# Patient Record
Sex: MALE | Race: WHITE | NOT HISPANIC OR LATINO | Employment: UNEMPLOYED | URBAN - METROPOLITAN AREA
[De-identification: names, ages, dates, MRNs, and addresses within clinical notes are randomized per-mention and may not be internally consistent; named-entity substitution may affect disease eponyms.]

---

## 2021-08-13 ENCOUNTER — HOSPITAL ENCOUNTER (INPATIENT)
Facility: HOSPITAL | Age: 56
LOS: 2 days | Discharge: HOME | DRG: 053 | End: 2021-08-15
Attending: EMERGENCY MEDICINE | Admitting: INTERNAL MEDICINE
Payer: COMMERCIAL

## 2021-08-13 ENCOUNTER — APPOINTMENT (EMERGENCY)
Dept: CT IMAGING | Facility: HOSPITAL | Age: 56
DRG: 053 | End: 2021-08-13
Payer: COMMERCIAL

## 2021-08-13 DIAGNOSIS — R56.9 SEIZURE (HCC): Primary | ICD-10-CM

## 2021-08-13 DIAGNOSIS — E83.42 HYPOMAGNESEMIA: ICD-10-CM

## 2021-08-13 DIAGNOSIS — E87.6 HYPOKALEMIA: ICD-10-CM

## 2021-08-13 DIAGNOSIS — E86.0 DEHYDRATION: ICD-10-CM

## 2021-08-13 PROBLEM — F10.10 ALCOHOL ABUSE: Status: ACTIVE | Noted: 2021-08-13

## 2021-08-13 LAB
ALBUMIN SERPL BCP-MCNC: 3.7 G/DL (ref 3.4–4.8)
ALP SERPL-CCNC: 75.2 U/L (ref 10–129)
ALT SERPL W P-5'-P-CCNC: 29 U/L (ref 5–63)
AMPHETAMINES SERPL QL SCN: NEGATIVE
ANION GAP SERPL CALCULATED.3IONS-SCNC: 14 MMOL/L (ref 4–13)
APAP SERPL-MCNC: <10 UG/ML (ref 10–20)
AST SERPL W P-5'-P-CCNC: 48 U/L (ref 15–41)
BARBITURATES UR QL: NEGATIVE
BASOPHILS # BLD AUTO: 0.03 THOUSANDS/ΜL (ref 0–0.1)
BASOPHILS NFR BLD AUTO: 1 % (ref 0–1)
BENZODIAZ UR QL: NEGATIVE
BILIRUB SERPL-MCNC: 1.15 MG/DL (ref 0.3–1.2)
BUN SERPL-MCNC: 12 MG/DL (ref 6–20)
CALCIUM SERPL-MCNC: 8.7 MG/DL (ref 8.4–10.2)
CHLORIDE SERPL-SCNC: 102 MMOL/L (ref 96–108)
CO2 SERPL-SCNC: 22 MMOL/L (ref 22–33)
COCAINE UR QL: NEGATIVE
CREAT SERPL-MCNC: 1.28 MG/DL (ref 0.5–1.2)
EOSINOPHIL # BLD AUTO: 0.04 THOUSAND/ΜL (ref 0–0.61)
EOSINOPHIL NFR BLD AUTO: 1 % (ref 0–6)
ERYTHROCYTE [DISTWIDTH] IN BLOOD BY AUTOMATED COUNT: 14.7 % (ref 11.6–15.1)
ETHANOL SERPL-MCNC: <10 MG/DL
GFR SERPL CREATININE-BSD FRML MDRD: 62 ML/MIN/1.73SQ M
GLUCOSE SERPL-MCNC: 88 MG/DL (ref 65–140)
HCT VFR BLD AUTO: 37.3 % (ref 36.5–49.3)
HGB BLD-MCNC: 12.7 G/DL (ref 12–17)
IMM GRANULOCYTES # BLD AUTO: 0.01 THOUSAND/UL (ref 0–0.2)
IMM GRANULOCYTES NFR BLD AUTO: 0 % (ref 0–2)
LYMPHOCYTES # BLD AUTO: 0.94 THOUSANDS/ΜL (ref 0.6–4.47)
LYMPHOCYTES NFR BLD AUTO: 16 % (ref 14–44)
MAGNESIUM SERPL-MCNC: 0.9 MG/DL (ref 1.6–2.6)
MCH RBC QN AUTO: 32.2 PG (ref 26.8–34.3)
MCHC RBC AUTO-ENTMCNC: 34 G/DL (ref 31.4–37.4)
MCV RBC AUTO: 95 FL (ref 82–98)
METHADONE UR QL: NEGATIVE
MONOCYTES # BLD AUTO: 0.68 THOUSAND/ΜL (ref 0.17–1.22)
MONOCYTES NFR BLD AUTO: 11 % (ref 4–12)
NEUTROPHILS # BLD AUTO: 4.3 THOUSANDS/ΜL (ref 1.85–7.62)
NEUTS SEG NFR BLD AUTO: 71 % (ref 43–75)
OPIATES UR QL SCN: NEGATIVE
OXYCODONE+OXYMORPHONE UR QL SCN: NEGATIVE
PCP UR QL: NEGATIVE
PLATELET # BLD AUTO: 201 THOUSANDS/UL (ref 149–390)
PMV BLD AUTO: 8.8 FL (ref 8.9–12.7)
POTASSIUM SERPL-SCNC: 3.4 MMOL/L (ref 3.5–5)
PROT SERPL-MCNC: 6.9 G/DL (ref 6.4–8.3)
RBC # BLD AUTO: 3.94 MILLION/UL (ref 3.88–5.62)
SALICYLATES SERPL-MCNC: <2.5 MG/DL (ref 6–30)
SODIUM SERPL-SCNC: 138 MMOL/L (ref 133–145)
THC UR QL: POSITIVE
TSH SERPL DL<=0.05 MIU/L-ACNC: 3.6 UIU/ML (ref 0.34–5.6)
WBC # BLD AUTO: 6 THOUSAND/UL (ref 4.31–10.16)

## 2021-08-13 PROCEDURE — G1004 CDSM NDSC: HCPCS

## 2021-08-13 PROCEDURE — 99285 EMERGENCY DEPT VISIT HI MDM: CPT

## 2021-08-13 PROCEDURE — 70450 CT HEAD/BRAIN W/O DYE: CPT

## 2021-08-13 PROCEDURE — 36415 COLL VENOUS BLD VENIPUNCTURE: CPT | Performed by: EMERGENCY MEDICINE

## 2021-08-13 PROCEDURE — 80307 DRUG TEST PRSMV CHEM ANLYZR: CPT | Performed by: EMERGENCY MEDICINE

## 2021-08-13 PROCEDURE — 96367 TX/PROPH/DG ADDL SEQ IV INF: CPT

## 2021-08-13 PROCEDURE — 99223 1ST HOSP IP/OBS HIGH 75: CPT | Performed by: PHYSICIAN ASSISTANT

## 2021-08-13 PROCEDURE — 84443 ASSAY THYROID STIM HORMONE: CPT | Performed by: PHYSICIAN ASSISTANT

## 2021-08-13 PROCEDURE — 80179 DRUG ASSAY SALICYLATE: CPT | Performed by: EMERGENCY MEDICINE

## 2021-08-13 PROCEDURE — 83735 ASSAY OF MAGNESIUM: CPT | Performed by: EMERGENCY MEDICINE

## 2021-08-13 PROCEDURE — 80053 COMPREHEN METABOLIC PANEL: CPT | Performed by: EMERGENCY MEDICINE

## 2021-08-13 PROCEDURE — 99285 EMERGENCY DEPT VISIT HI MDM: CPT | Performed by: EMERGENCY MEDICINE

## 2021-08-13 PROCEDURE — 85025 COMPLETE CBC W/AUTO DIFF WBC: CPT | Performed by: EMERGENCY MEDICINE

## 2021-08-13 PROCEDURE — 96361 HYDRATE IV INFUSION ADD-ON: CPT

## 2021-08-13 PROCEDURE — 82077 ASSAY SPEC XCP UR&BREATH IA: CPT | Performed by: EMERGENCY MEDICINE

## 2021-08-13 PROCEDURE — 96365 THER/PROPH/DIAG IV INF INIT: CPT

## 2021-08-13 PROCEDURE — 93005 ELECTROCARDIOGRAM TRACING: CPT

## 2021-08-13 PROCEDURE — 80143 DRUG ASSAY ACETAMINOPHEN: CPT | Performed by: EMERGENCY MEDICINE

## 2021-08-13 PROCEDURE — 96375 TX/PRO/DX INJ NEW DRUG ADDON: CPT

## 2021-08-13 RX ORDER — POTASSIUM CHLORIDE 14.9 MG/ML
20 INJECTION INTRAVENOUS ONCE
Status: COMPLETED | OUTPATIENT
Start: 2021-08-13 | End: 2021-08-13

## 2021-08-13 RX ORDER — LORAZEPAM 1 MG/1
2 TABLET ORAL ONCE
Status: COMPLETED | OUTPATIENT
Start: 2021-08-13 | End: 2021-08-13

## 2021-08-13 RX ORDER — POTASSIUM CHLORIDE 20 MEQ/1
40 TABLET, EXTENDED RELEASE ORAL ONCE
Status: COMPLETED | OUTPATIENT
Start: 2021-08-13 | End: 2021-08-13

## 2021-08-13 RX ORDER — SODIUM CHLORIDE 9 MG/ML
125 INJECTION, SOLUTION INTRAVENOUS CONTINUOUS
Status: DISCONTINUED | OUTPATIENT
Start: 2021-08-13 | End: 2021-08-14

## 2021-08-13 RX ORDER — ACETAMINOPHEN 325 MG/1
650 TABLET ORAL EVERY 6 HOURS PRN
Status: DISCONTINUED | OUTPATIENT
Start: 2021-08-13 | End: 2021-08-15 | Stop reason: HOSPADM

## 2021-08-13 RX ORDER — LORAZEPAM 2 MG/ML
INJECTION INTRAMUSCULAR
Status: COMPLETED
Start: 2021-08-13 | End: 2021-08-13

## 2021-08-13 RX ORDER — LEVETIRACETAM 500 MG/1
750 TABLET ORAL EVERY 12 HOURS SCHEDULED
Status: DISCONTINUED | OUTPATIENT
Start: 2021-08-14 | End: 2021-08-15 | Stop reason: HOSPADM

## 2021-08-13 RX ORDER — LORAZEPAM 2 MG/ML
2 INJECTION INTRAMUSCULAR EVERY 6 HOURS PRN
Status: DISCONTINUED | OUTPATIENT
Start: 2021-08-13 | End: 2021-08-15 | Stop reason: HOSPADM

## 2021-08-13 RX ORDER — NICOTINE 21 MG/24HR
1 PATCH, TRANSDERMAL 24 HOURS TRANSDERMAL DAILY
Status: DISCONTINUED | OUTPATIENT
Start: 2021-08-14 | End: 2021-08-15 | Stop reason: HOSPADM

## 2021-08-13 RX ORDER — LORAZEPAM 2 MG/ML
2 INJECTION INTRAMUSCULAR ONCE
Status: COMPLETED | OUTPATIENT
Start: 2021-08-13 | End: 2021-08-13

## 2021-08-13 RX ORDER — MAGNESIUM SULFATE HEPTAHYDRATE 40 MG/ML
2 INJECTION, SOLUTION INTRAVENOUS ONCE
Status: COMPLETED | OUTPATIENT
Start: 2021-08-13 | End: 2021-08-13

## 2021-08-13 RX ADMIN — LORAZEPAM 2 MG: 2 INJECTION, SOLUTION INTRAMUSCULAR; INTRAVENOUS at 19:29

## 2021-08-13 RX ADMIN — LORAZEPAM 2 MG: 1 TABLET ORAL at 21:35

## 2021-08-13 RX ADMIN — SODIUM CHLORIDE 1000 ML: 0.9 INJECTION, SOLUTION INTRAVENOUS at 17:38

## 2021-08-13 RX ADMIN — SODIUM CHLORIDE 125 ML/HR: 0.9 INJECTION, SOLUTION INTRAVENOUS at 21:35

## 2021-08-13 RX ADMIN — MAGNESIUM OXIDE TAB 400 MG (241.3 MG ELEMENTAL MG) 800 MG: 400 (241.3 MG) TAB at 18:35

## 2021-08-13 RX ADMIN — LEVETIRACETAM 1500 MG: 100 INJECTION, SOLUTION INTRAVENOUS at 18:57

## 2021-08-13 RX ADMIN — POTASSIUM CHLORIDE 40 MEQ: 1500 TABLET, EXTENDED RELEASE ORAL at 18:35

## 2021-08-13 RX ADMIN — LORAZEPAM 2 MG: 2 INJECTION INTRAMUSCULAR; INTRAVENOUS at 18:41

## 2021-08-13 RX ADMIN — MAGNESIUM SULFATE HEPTAHYDRATE 2 G: 40 INJECTION, SOLUTION INTRAVENOUS at 18:35

## 2021-08-13 RX ADMIN — POTASSIUM CHLORIDE 20 MEQ: 14.9 INJECTION, SOLUTION INTRAVENOUS at 19:16

## 2021-08-13 NOTE — ED NOTES
Pt restless attempting to ambulate, unable to follow directions   md seen pt    Ativan 2mg given will con't to monitor      German Mares RN  08/13/21 1931

## 2021-08-13 NOTE — ED NOTES
1837 pt had episode of seizure (possi 3 mins) ativan 2mg given 2l 02 md dwaine seen pt con't on cardiac monitor, awake but speech garbled      Silvio Ibarra, RN  08/13/21 1911

## 2021-08-13 NOTE — ED PROVIDER NOTES
History  Chief Complaint   Patient presents with    Seizure - Prior Hx Of     wittness seizure, c/o right elbow discomfort, LOC, reported last seizure 3years ago   denies hitting head, denies c-s tenderness        History provided by:  Patient and EMS personnel   used: No    Seizure - Prior Hx Of  77-year-old male brought by EMS for evaluation after having a witnessed seizure at home  Was witnessed by nephew, reportedly GTC and lasting approximately 2 minutes with fairly quick return to baseline  Initially EMS reported this may be secondary to alcohol withdrawal   Patient reports drinking a 6 pack of beer daily  States he had already had 4 beers today  Denies missing any normal daily drinking  Reports that he was and an out of house today, and heat, greater than 90°  Seizure occurred in the house  No significant trauma  Patient denies headache, neck pain, chest or abdominal pain  No tongue injury or incontinence  He reports history of seizure once in the past, 3 years ago which he states was related to the heat  Unclear if it was truly a seizure and if he had a workup or if it was more of a syncopal episode  No prior records available  Unremarkable neurologic exam here  Is mildly tachycardic on arrival   Plan CT head, EKG, fluids and will re-evaluate  None       Past Medical History:   Diagnosis Date    Alcohol abuse        History reviewed  No pertinent surgical history  History reviewed  No pertinent family history  I have reviewed and agree with the history as documented      E-Cigarette/Vaping    E-Cigarette Use Never User      E-Cigarette/Vaping Substances    Nicotine No     THC No     CBD No     Flavoring No     Other No     Unknown No      Social History     Tobacco Use    Smoking status: Current Every Day Smoker     Packs/day: 1 00     Types: Cigarettes    Smokeless tobacco: Current User    Tobacco comment: 1 pack per day    Vaping Use    Vaping Use: Never used   Substance Use Topics    Alcohol use: Yes     Alcohol/week: 6 0 standard drinks     Types: 6 Cans of beer per week     Comment: reported dring mostly every day especially on the weekend     Drug use: Never       Review of Systems   Constitutional: Negative for activity change, appetite change, fatigue and fever  Respiratory: Negative for chest tightness and shortness of breath  Cardiovascular: Negative for chest pain and palpitations  Gastrointestinal: Negative for abdominal pain, nausea and vomiting  Musculoskeletal: Negative for back pain and neck pain  Skin: Negative for color change and rash  Neurological: Positive for seizures  Negative for dizziness and headaches  All other systems reviewed and are negative  Physical Exam  Physical Exam  Vitals and nursing note reviewed  Constitutional:       Appearance: Normal appearance  HENT:      Head: Normocephalic and atraumatic  Eyes:      Extraocular Movements: Extraocular movements intact  Pupils: Pupils are equal, round, and reactive to light  Neck:      Comments: No C-spine tenderness  Cardiovascular:      Rate and Rhythm: Normal rate and regular rhythm  Pulses: Normal pulses  Heart sounds: Normal heart sounds  Pulmonary:      Effort: Pulmonary effort is normal       Breath sounds: Normal breath sounds  Abdominal:      General: There is no distension  Palpations: Abdomen is soft  Tenderness: There is no abdominal tenderness  Musculoskeletal:         General: No swelling, tenderness or deformity  Normal range of motion  Cervical back: Normal range of motion and neck supple  Skin:     General: Skin is warm and dry  Neurological:      General: No focal deficit present  Mental Status: He is alert and oriented to person, place, and time  Mental status is at baseline     Psychiatric:         Mood and Affect: Mood normal          Behavior: Behavior normal          Vital Signs  ED Triage Vitals [08/13/21 1728]   Temperature Pulse Respirations Blood Pressure SpO2   98 3 °F (36 8 °C) 98 16 141/91 97 %      Temp Source Heart Rate Source Patient Position - Orthostatic VS BP Location FiO2 (%)   Oral Monitor Lying Right arm --      Pain Score       No Pain           Vitals:    08/13/21 1728 08/13/21 1911   BP: 141/91 (!) 168/101   Pulse: 98    Patient Position - Orthostatic VS: Lying Lying         Visual Acuity      ED Medications  Medications   potassium chloride 20 mEq IVPB (premix) (20 mEq Intravenous New Bag 8/13/21 1916)   sodium chloride 0 9 % bolus 1,000 mL (0 mL Intravenous Stopped 8/13/21 1834)   potassium chloride (K-DUR,KLOR-CON) CR tablet 40 mEq (40 mEq Oral Given 8/13/21 1835)   magnesium sulfate 2 g/50 mL IVPB (premix) 2 g (0 g Intravenous Stopped 8/13/21 1858)   magnesium oxide (MAG-OX) tablet 800 mg (800 mg Oral Given 8/13/21 1835)   LORazepam (ATIVAN) 2 mg/mL injection **ADS Override Pull** (2 mg  Given 8/13/21 1841)   LORazepam (ATIVAN) injection 2 mg (2 mg Intravenous Given 8/13/21 1929)   levETIRAcetam (KEPPRA) 1,500 mg in sodium chloride 0 9 % 100 mL IVPB (0 mg Intravenous Stopped 8/13/21 1914)       Diagnostic Studies  Results Reviewed     Procedure Component Value Units Date/Time    Rapid drug screen, urine [[id number]]  (Abnormal) Collected: 08/13/21 1834    Lab Status: Final result Specimen: Urine, Clean Catch Updated: 08/13/21 1906     Amph/Meth UR Negative     Barbiturate Ur Negative     Benzodiazepine Urine Negative     Cocaine Urine Negative     Methadone Urine Negative     Opiate Urine Negative     PCP Ur Negative     THC Urine Positive     Oxycodone Urine Negative    Narrative:      Presumptive report  If requested, specimen will be sent to reference lab for confirmation  FOR MEDICAL PURPOSES ONLY  IF CONFIRMATION NEEDED PLEASE CONTACT THE LAB WITHIN 5 DAYS      Drug Screen Cutoff Levels:  AMPHETAMINE/METHAMPHETAMINES  1000 ng/mL  BARBITURATES     200 ng/mL  BENZODIAZEPINES     200 ng/mL  COCAINE      300 ng/mL  METHADONE      300 ng/mL  OPIATES      300 ng/mL  PHENCYCLIDINE     25 ng/mL  THC       50 ng/mL  OXYCODONE      100 ng/mL    Magnesium [[id number]]  (Abnormal) Collected: 08/13/21 1735    Lab Status: Final result Specimen: Blood from Arm, Left Updated: 08/13/21 1812     Magnesium 0 9 mg/dL     Comprehensive metabolic panel [[id number]]  (Abnormal) Collected: 08/13/21 1735    Lab Status: Final result Specimen: Blood from Arm, Left Updated: 08/13/21 1801     Sodium 138 mmol/L      Potassium 3 4 mmol/L      Chloride 102 mmol/L      CO2 22 mmol/L      ANION GAP 14 mmol/L      BUN 12 mg/dL      Creatinine 1 28 mg/dL      Glucose 88 mg/dL      Calcium 8 7 mg/dL      AST 48 U/L      ALT 29 U/L      Alkaline Phosphatase 75 2 U/L      Total Protein 6 9 g/dL      Albumin 3 7 g/dL      Total Bilirubin 1 15 mg/dL      eGFR 62 ml/min/1 73sq m     Narrative:      [place] guidelines for Chronic Kidney Disease (CKD):     Stage 1 with normal or high GFR (GFR > 90 mL/min/1 73 square meters)    Stage 2 Mild CKD (GFR = 60-89 mL/min/1 73 square meters)    Stage 3A Moderate CKD (GFR = 45-59 mL/min/1 73 square meters)    Stage 3B Moderate CKD (GFR = 30-44 mL/min/1 73 square meters)    Stage 4 Severe CKD (GFR = 15-29 mL/min/1 73 square meters)    Stage 5 End Stage CKD (GFR <15 mL/min/1 73 square meters)  Note: GFR calculation is accurate only with a steady state creatinine    Ethanol [[id number]]  (Normal) Collected: 08/13/21 1735    Lab Status: Final result Specimen: Blood from Arm, Left Updated: 08/13/21 1801     Ethanol Lvl <87 mg/dL     Salicylate level [[id number]]  (Abnormal) Collected: 08/13/21 1735    Lab Status: Final result Specimen: Blood from Arm, Left Updated: 31/10/55 8386     Salicylate Lvl <0 7 mg/dL     Acetaminophen level-If concentration is detectable, please discuss with medical  on call   [[id number]]  (Abnormal) Collected: 08/13/21 1735    Lab Status: Final result Specimen: Blood from Arm, Left Updated: 08/13/21 1759     Acetaminophen Level <10 ug/mL     CBC and differential [[id number]]  (Abnormal) Collected: 08/13/21 1735    Lab Status: Final result Specimen: Blood from Arm, Left Updated: 08/13/21 1745     WBC 6 00 Thousand/uL      RBC 3 94 Million/uL      Hemoglobin 12 7 g/dL      Hematocrit 37 3 %      MCV 95 fL      MCH 32 2 pg      MCHC 34 0 g/dL      RDW 14 7 %      MPV 8 8 fL      Platelets 622 Thousands/uL      Neutrophils Relative 71 %      Immat GRANS % 0 %      Lymphocytes Relative 16 %      Monocytes Relative 11 %      Eosinophils Relative 1 %      Basophils Relative 1 %      Neutrophils Absolute 4 30 Thousands/µL      Immature Grans Absolute 0 01 Thousand/uL      Lymphocytes Absolute 0 94 Thousands/µL      Monocytes Absolute 0 68 Thousand/µL      Eosinophils Absolute 0 04 Thousand/µL      Basophils Absolute 0 03 Thousands/µL                  CT head without contrast   Final Result by Jarad Ireland MD (08/13 1835)      No acute intracranial abnormality  Scalp soft tissue swelling at the vertex on the right without underlying fracture              Workstation performed: [id number]                    Procedures  ECG 12 Lead Documentation Only    Date/Time: 8/13/2021 6:05 PM  Performed by: Allison Santiago MD  Authorized by: Allison Santiago MD     Indications / Diagnosis:  Syncope/seizure  ECG reviewed by me, the ED Provider: yes    Patient location:  ED  Previous ECG:     Previous ECG:  Unavailable  Rate:     ECG rate:  94  Rhythm:     Rhythm: sinus rhythm    Ectopy:     Ectopy: none    QRS:     QRS axis:  Normal  Conduction:     Conduction: normal    ST segments:     ST segments:  Normal  T waves:     T waves: normal               ED Course  ED Course as of Aug 13 2028   Fri Aug 13, 2021   1807 Potassium(!): 3 4   1807 Creatinine(!): 1 28   1807 AST(!): 48   1816 Magnesium(!!): 0 9   1847 Patient had seizure in the ED while taking oral potassium pills  Generalized tonic clonic lasting about 1-2 minutes and resolving spontaneously  Giving Ativan, Keppra load  Will need admission  1928 Patient remains agitated, post-ictal, not redirectable  Risk of harming self  Giving additional 2mg ativan  1930 Discussed case with on-call epileptologist   Notes no indication for continuous EEG monitoring  Will plan admission with possible routine EEG  MDM  Number of Diagnoses or Management Options  Dehydration: new and requires workup  Hypokalemia: new and requires workup  Hypomagnesemia: new and requires workup  Seizure [facility]): new and requires workup  Diagnosis management comments: 57-year-old male brought for evaluation of seizure at home today witnessed by a nephew  Patient had recurrent seizure in the ED which maintaining a slightly resolved after about 2 minutes  He was acutely agitated in the postictal phase requiring IV Ativan with improvement  He was also loaded with 20mg/kg of Keppra without recurrent seizure at this point  Lab work remarkable for hypomagnesia, hypokalemia which were repleted in the ED  Elevated creatinine but unclear baseline as he has no prior lab work here  Case discussed with epileptologist   Yady King with admission  Does not need continuous EEG monitoring  Admitted to medical service in stable condition         Amount and/or Complexity of Data Reviewed  Clinical lab tests: ordered and reviewed  Tests in the radiology section of CPT®: ordered and reviewed  Obtain history from someone other than the patient: yes  Discuss the patient with other providers: yes  Independent visualization of images, tracings, or specimens: yes    Patient Progress  Patient progress: improved      Disposition  Final diagnoses:   Seizure ([address] )   Hypomagnesemia   Hypokalemia   Dehydration     Time reflects when diagnosis was documented in both MDM as applicable and the Disposition within this note     Time User Action Codes Description Comment    8/13/2021  6:16 PM Waynard Lennert Add [R56 9] Seizure ([address] )     8/13/2021  6:16 PM Waynard Lennert Add [E83 42] Hypomagnesemia     8/13/2021  6:16 PM Gwenette Zechariah J Add [E87 6] Hypokalemia     8/13/2021  6:17 PM Waynard Lennert Add [E86 0] Dehydration       ED Disposition     ED Disposition Condition Date/Time Comment    Admit Stable Fri Aug 13, 2021  7:58 PM Case was discussed with Miguel and the patient's admission status was agreed to be Admission Status: inpatient status to the service of Dr Raleigh White   Follow-up Information    None         Patient's Medications    No medications on file     No discharge procedures on file      PDMP Review     None          ED Provider  Electronically Signed by           Miguel Penn MD  08/13/21 2028

## 2021-08-14 LAB
ANION GAP SERPL CALCULATED.3IONS-SCNC: 9 MMOL/L (ref 4–13)
BUN SERPL-MCNC: 9 MG/DL (ref 6–20)
CALCIUM SERPL-MCNC: 7.8 MG/DL (ref 8.4–10.2)
CHLORIDE SERPL-SCNC: 105 MMOL/L (ref 96–108)
CO2 SERPL-SCNC: 25 MMOL/L (ref 22–33)
CREAT SERPL-MCNC: 0.88 MG/DL (ref 0.5–1.2)
ERYTHROCYTE [DISTWIDTH] IN BLOOD BY AUTOMATED COUNT: 14.9 % (ref 11.6–15.1)
GFR SERPL CREATININE-BSD FRML MDRD: 95 ML/MIN/1.73SQ M
GLUCOSE SERPL-MCNC: 73 MG/DL (ref 65–140)
HCT VFR BLD AUTO: 35.9 % (ref 36.5–49.3)
HGB BLD-MCNC: 12.2 G/DL (ref 12–17)
MAGNESIUM SERPL-MCNC: 1.5 MG/DL (ref 1.6–2.6)
MCH RBC QN AUTO: 32.6 PG (ref 26.8–34.3)
MCHC RBC AUTO-ENTMCNC: 34 G/DL (ref 31.4–37.4)
MCV RBC AUTO: 96 FL (ref 82–98)
PLATELET # BLD AUTO: 179 THOUSANDS/UL (ref 149–390)
PMV BLD AUTO: 9.4 FL (ref 8.9–12.7)
POTASSIUM SERPL-SCNC: 3.9 MMOL/L (ref 3.5–5)
RBC # BLD AUTO: 3.74 MILLION/UL (ref 3.88–5.62)
SODIUM SERPL-SCNC: 139 MMOL/L (ref 133–145)
WBC # BLD AUTO: 5.96 THOUSAND/UL (ref 4.31–10.16)

## 2021-08-14 PROCEDURE — 83735 ASSAY OF MAGNESIUM: CPT | Performed by: PHYSICIAN ASSISTANT

## 2021-08-14 PROCEDURE — 99232 SBSQ HOSP IP/OBS MODERATE 35: CPT | Performed by: INTERNAL MEDICINE

## 2021-08-14 PROCEDURE — 80048 BASIC METABOLIC PNL TOTAL CA: CPT | Performed by: PHYSICIAN ASSISTANT

## 2021-08-14 PROCEDURE — 85027 COMPLETE CBC AUTOMATED: CPT | Performed by: PHYSICIAN ASSISTANT

## 2021-08-14 PROCEDURE — 94762 N-INVAS EAR/PLS OXIMTRY CONT: CPT

## 2021-08-14 PROCEDURE — 99222 1ST HOSP IP/OBS MODERATE 55: CPT | Performed by: PSYCHIATRY & NEUROLOGY

## 2021-08-14 RX ORDER — MAGNESIUM SULFATE HEPTAHYDRATE 40 MG/ML
4 INJECTION, SOLUTION INTRAVENOUS ONCE
Status: COMPLETED | OUTPATIENT
Start: 2021-08-14 | End: 2021-08-14

## 2021-08-14 RX ORDER — SODIUM CHLORIDE 9 MG/ML
75 INJECTION, SOLUTION INTRAVENOUS CONTINUOUS
Status: DISCONTINUED | OUTPATIENT
Start: 2021-08-14 | End: 2021-08-14

## 2021-08-14 RX ORDER — POTASSIUM CHLORIDE 14.9 MG/ML
20 INJECTION INTRAVENOUS ONCE
Status: COMPLETED | OUTPATIENT
Start: 2021-08-14 | End: 2021-08-14

## 2021-08-14 RX ORDER — SODIUM CHLORIDE 9 MG/ML
75 INJECTION, SOLUTION INTRAVENOUS CONTINUOUS
Status: DISPENSED | OUTPATIENT
Start: 2021-08-14 | End: 2021-08-14

## 2021-08-14 RX ADMIN — SODIUM CHLORIDE 125 ML/HR: 0.9 INJECTION, SOLUTION INTRAVENOUS at 07:41

## 2021-08-14 RX ADMIN — Medication 1 PATCH: at 15:23

## 2021-08-14 RX ADMIN — SODIUM CHLORIDE 75 ML/HR: 0.9 INJECTION, SOLUTION INTRAVENOUS at 13:56

## 2021-08-14 RX ADMIN — THIAMINE HYDROCHLORIDE: 100 INJECTION, SOLUTION INTRAMUSCULAR; INTRAVENOUS at 13:55

## 2021-08-14 RX ADMIN — SODIUM CHLORIDE 75 ML/HR: 0.9 INJECTION, SOLUTION INTRAVENOUS at 09:14

## 2021-08-14 RX ADMIN — LEVETIRACETAM 750 MG: 500 TABLET, FILM COATED ORAL at 08:31

## 2021-08-14 RX ADMIN — POTASSIUM CHLORIDE 20 MEQ: 14.9 INJECTION, SOLUTION INTRAVENOUS at 09:13

## 2021-08-14 RX ADMIN — LEVETIRACETAM 750 MG: 500 TABLET, FILM COATED ORAL at 20:44

## 2021-08-14 RX ADMIN — SODIUM CHLORIDE 75 ML/HR: 0.9 INJECTION, SOLUTION INTRAVENOUS at 18:00

## 2021-08-14 RX ADMIN — MAGNESIUM SULFATE HEPTAHYDRATE 4 G: 40 INJECTION, SOLUTION INTRAVENOUS at 11:42

## 2021-08-14 NOTE — H&P
Latrice U  64   H&P- Gabby Mayberry 1965, 60 y o  male MRN: [id number]  Unit/Bed#: ED 05 Encounter: [id number]  Primary Care Provider: No primary care provider on file  Date and time admitted to hospital: 8/13/2021  5:22 PM      REQUIRED DOCUMENTATION:     1  This service was provided via Telemedicine  2  Provider located at [facility] Internal Medicine Office   3  TeleMed provider: Nataly Horta PA-C   4  Identify all parties in room with patient during tele consult:  RN  5  Patient was then informed that this was a Telemedicine visit and that the exam was being conducted confidentially over secure lines  My office door was closed  No one else was in the room  Patient acknowledged consent and understanding of privacy and security of the Telemedicine visit, and gave us permission to have the assistant stay in the room in order to assist with the history and to conduct the exam   I informed the patient that I have reviewed their record in Epic and presented the opportunity for them to ask any questions regarding the visit today  The patient agreed to participate  * Seizure [facility])  Assessment & Plan  · Prior to admission as well as witnessed in the ED  Prior seizure history noted  Does not appear to be on medications  Daily drinker ? ETOH withdrawal  Presently post-ictal  CT head negative   · Admit patient to med/surg under inpatient status   · Consult Neurology   · Status post Keppra 1,500 mg BID   · Continue Keppra 750 mg BID thereafter  · Seizure precautions   · On CIWA  · Telemetry     Alcohol abuse  Assessment & Plan  · Reported that patient drinks 6 pack daily  Drank 4 beers today  ETOH negative on admission   · CIWA protocol   · MV, Thiamine, Folic Acid IV due to post-ictal state     Hypokalemia  Assessment & Plan  · Noted at 3 4   Status post repletion in the ED  · BMP in AM     Hypomagnesemia  Assessment & Plan  · Noted at 0 9, status post repletion in the ED  · Mag in AM       VTE Pharmacologic Prophylaxis: VTE Score: 1 Low Risk (Score 0-2) - Encourage Ambulation  Code Status: Full Code   Discussion with family: None at bedside   Anticipated Length of Stay: Patient will be admitted on an inpatient basis with an anticipated length of stay of greater than 2 midnights secondary to As per above assessment and plan   Total Time for Visit, including Counseling / Coordination of Care: 70 minutes Greater than 50% of this total time spent on direct patient counseling and coordination of care  Chief Complaint: Seizure     History of Present Illness:  Ynes Barber is a 60 y o  male with a PMH of seizures, ETOH abuse who presents with seizures  History currently unable to be obtained from patient due to post-ictal state  As per ED MD H&P he had a witnessed seizure at home by his nephew  Lasted approximately 2 minutes  It is reported that he drinks a 6 pack of beer daily and drank 4 beers today  Patient had a second seizure in ED and is now post-ictal      Review of Systems:  Review of Systems   Unable to perform ROS: Mental status change       Past Medical and Surgical History:   Past Medical History:   Diagnosis Date    Alcohol abuse        History reviewed  No pertinent surgical history  Meds/Allergies:  Prior to Admission medications    Not on File     I have reviewed home medications using recent Epic encounter      Allergies: No Known Allergies    Social History:  Marital Status: Unknown   Occupation: Noncontributory   Patient Pre-hospital Living Situation: Home  Patient Pre-hospital Level of Mobility: walks  Patient Pre-hospital Diet Restrictions: None  Substance Use History:   Social History     Substance and Sexual Activity   Alcohol Use Yes    Alcohol/week: 6 0 standard drinks    Types: 6 Cans of beer per week    Comment: reported dring mostly every day especially on the weekend      Social History     Tobacco Use   Smoking Status Current Every Day Smoker    Packs/day: 1 00    Types: Cigarettes   Smokeless Tobacco Current User   Tobacco Comment    1 pack per day      Social History     Substance and Sexual Activity   Drug Use Never       Family History:  History reviewed  No pertinent family history  Physical Exam:     Vitals:   Blood Pressure: (!) 168/101 (08/13/21 1911)  Pulse: 98 (08/13/21 1728)  Temperature: 98 3 °F (36 8 °C) (08/13/21 1728)  Temp Source: Oral (08/13/21 1728)  Respirations: 22 (08/13/21 1911)  Height: 5' 11" (180 3 cm) (08/13/21 1728)  Weight - Scale: 65 8 kg (145 lb) (08/13/21 1728)  SpO2: 97 % (on 2l o2 ) (08/13/21 1911)    Physical Exam  Constitutional:       General: He is not in acute distress  Appearance: Normal appearance  He is normal weight  He is not ill-appearing or diaphoretic  HENT:      Head: Normocephalic and atraumatic  Mouth/Throat:      Mouth: Mucous membranes are moist    Eyes:      General: No scleral icterus  Pupils: Pupils are equal, round, and reactive to light  Cardiovascular:      Rate and Rhythm: Normal rate and regular rhythm  Pulses: Normal pulses  Heart sounds: Normal heart sounds, S1 normal and S2 normal  No murmur heard  No systolic murmur is present  No diastolic murmur is present  No gallop  No S3 or S4 sounds  Pulmonary:      Effort: Pulmonary effort is normal  No accessory muscle usage or respiratory distress  Breath sounds: Normal breath sounds  No stridor  No decreased breath sounds, wheezing, rhonchi or rales  Chest:      Chest wall: No tenderness  Abdominal:      General: Bowel sounds are normal  There is no distension  Palpations: Abdomen is soft  Tenderness: There is no abdominal tenderness  There is no guarding  Musculoskeletal:      Right lower leg: No edema  Left lower leg: No edema  Skin:     General: Skin is warm and dry  Coloration: Skin is not jaundiced  Neurological:      General: No focal deficit present        Mental Status: He is confused  Motor: Seizure activity (post-ictal) present  Psychiatric:         Behavior: Behavior is cooperative  Additional Data:     Lab Results:  Results from last 7 days   Lab Units 08/13/21  1735   WBC Thousand/uL 6 00   HEMOGLOBIN g/dL 12 7   HEMATOCRIT % 37 3   PLATELETS Thousands/uL 201   NEUTROS PCT % 71   LYMPHS PCT % 16   MONOS PCT % 11   EOS PCT % 1     Results from last 7 days   Lab Units 08/13/21  1735   SODIUM mmol/L 138   POTASSIUM mmol/L 3 4*   CHLORIDE mmol/L 102   CO2 mmol/L 22   BUN mg/dL 12   CREATININE mg/dL 1 28*   ANION GAP mmol/L 14*   CALCIUM mg/dL 8 7   ALBUMIN g/dL 3 7   TOTAL BILIRUBIN mg/dL 1 15   ALK PHOS U/L 75 2   ALT U/L 29   AST U/L 48*   GLUCOSE RANDOM mg/dL 88                       Imaging: Reviewed radiology reports from this admission including: CT head  CT head without contrast   Final Result by Jeremiah Fernández MD (08/13 1835)      No acute intracranial abnormality  Scalp soft tissue swelling at the vertex on the right without underlying fracture  Workstation performed: [id number]             EKG and Other Studies Reviewed on Admission:   · EKG: NSR  HR 94 BPM    · CT Head: No acute intracranial abnormality  Scalp soft tissue swelling at the vertex on the right without underlying fracture  ** Please Note: This note has been constructed using a voice recognition system   **

## 2021-08-14 NOTE — PROGRESS NOTES
Latrice U  64   Progress Note - Lisa Life 1965, 60 y o  male MRN: [id number]  Unit/Bed#: MS Oleksandr-Chandan Encounter: [id number]  Primary Care Provider: No primary care provider on file  Date and time admitted to hospital: 2021  5:22 PM    * Seizure [facility])  Assessment & Plan  Admitted seizures  Neurology input noted - Provoked versus unprovoked  Continue Keppra  Seizure precautions, Ativan p r n  Hypomagnesemia  Assessment & Plan  · Replete  · Monitor    Hypokalemia  Assessment & Plan  · Replete  · Monitor    Alcohol abuse  Assessment & Plan  · Reported that patient drinks 6 pack daily  · CIWA protocol   · MV, Thiamine, Folic Acid   · Alcohol cessation discussed          VTE Pharmacologic Prophylaxis:   Pharmacologic: Vena dynes  Mechanical VTE Prophylaxis in Place: Yes    Patient Centered Rounds: I have performed bedside rounds with nursing staff today  Discussions with Specialists or Other Care Team Provider:     Education and Discussions with Family / Patient:  Discussed with the patient, reports he is keeping his family updated    Time Spent for Care: 30 minutes  More than 50% of total time spent on counseling and coordination of care as described above      Current Length of Stay: 1 day(s)    Current Patient Status: Inpatient   Certification Statement: The patient will continue to require additional inpatient hospital stay due to As outlined    Discharge Plan:  Awaiting clinical and symptomatic improvement, Neurology input noted possible discharge 24 to 48 hours    Code Status: Level 1 - Full Code      Subjective:     Comfortably lying in bed  Reports feeling okay  History chart labs medications reviewed  Encouraged out of bed into chair    Objective:     Vitals:   Temp (24hrs), Av 8 °F (37 1 °C), Min:98 3 °F (36 8 °C), Max:99 9 °F (37 7 °C)    Temp:  [98 3 °F (36 8 °C)-99 9 °F (37 7 °C)] 98 4 °F (36 9 °C)  HR:  [74-98] 77  Resp:  [16-22] 18  BP: (141-168)/() 145/101  SpO2:  [93 %-99 %] 98 %  Body mass index is 21 19 kg/m²  Input and Output Summary (last 24 hours): Intake/Output Summary (Last 24 hours) at 8/14/2021 1241  Last data filed at 8/14/2021 1142  Gross per 24 hour   Intake 1563 33 ml   Output 1100 ml   Net 463 33 ml       Physical Exam:     Physical Exam    Comfortably in bed  Neck supple  Lungs diminished breath sounds bilaterally  Heart sounds S1 and S2 noted  Abdomen soft  Awake obeys simple commands  Pulses noted  No rash  No pedal edema    Additional Data:     Labs:    Results from last 7 days   Lab Units 08/14/21  0530 08/13/21  1735   WBC Thousand/uL 5 96 6 00   HEMOGLOBIN g/dL 12 2 12 7   HEMATOCRIT % 35 9* 37 3   PLATELETS Thousands/uL 179 201   NEUTROS PCT %  --  71   LYMPHS PCT %  --  16   MONOS PCT %  --  11   EOS PCT %  --  1     Results from last 7 days   Lab Units 08/14/21  0531 08/13/21  1735   SODIUM mmol/L 139 138   POTASSIUM mmol/L 3 9 3 4*   CHLORIDE mmol/L 105 102   CO2 mmol/L 25 22   BUN mg/dL 9 12   CREATININE mg/dL 0 88 1 28*   ANION GAP mmol/L 9 14*   CALCIUM mg/dL 7 8* 8 7   ALBUMIN g/dL  --  3 7   TOTAL BILIRUBIN mg/dL  --  1 15   ALK PHOS U/L  --  75 2   ALT U/L  --  29   AST U/L  --  48*   GLUCOSE RANDOM mg/dL 73 88                           * I Have Reviewed All Lab Data Listed Above  * Additional Pertinent Lab Tests Reviewed:  All Labs Within Last 24 Hours Reviewed    Imaging:    Imaging Reports Reviewed Today Include:  CT head no acute intracranial abnormality  Imaging Personally Reviewed by Myself Includes:      Recent Cultures (last 7 days):           Last 24 Hours Medication List:   Current Facility-Administered Medications   Medication Dose Route Frequency Provider Last Rate    acetaminophen  650 mg Oral Q6H PRN Miguel Masters PA-C      folic acid 1 mg, thiamine 100 mg in 0 9% sodium chloride 100 mL IVPB   Intravenous Daily Miguel Masters PA-C      levETIRAcetam  750 mg Oral Q12H [address] Miguel Masters PA-C      LORazepam  2 mg Intravenous Q6H PRN Melvina Birmingham PA-C      magnesium sulfate  4 g Intravenous Once Octavia Gomez MD      nicotine  1 patch Transdermal Daily Miguel Masters PA-C      sodium chloride  75 mL/hr Intravenous Continuous Octavia Gomez MD 75 mL/hr (08/14/21 0914)        Today, Patient Was Seen By: Octavia Gomez MD    ** Please Note: Dictation voice to text software may have been used in the creation of this document   **

## 2021-08-14 NOTE — ASSESSMENT & PLAN NOTE
Recurrent seizures, unclear whether provoked or unprovoked though suspicion for new epilepsy overall low  He denies any cessation of drinking, has no clear withdrawal symptomatology, and timing would not be consistent with a withdrawal seizure, though that said his etoh level was undetectable  He was also in significant heat, and with dehydration that may have provoked seizure along with his etoh use   At this time he is back to baseline without further seizure     -continue neurochecks; notify with changes  -seizure precautions  -HCT reviewed - unremarkable  -may continue Keppra 750mg BID though low suspicion for epilepsy - will likely not need this long-term  -would ideally obtain routine EEG if able - if unable this can be done as an outpatient  -no clear indication for MRI as inpatient but may be done as an outpatient as well  -monitor for alcohol withdrawal  -Ativan PRN for recurrent seizure > 3-5 mins or recurrent events without return to baseline  -if repeat seizure would recommend inpatient EEG and possible transfer if indicated  -education of alcohol cessation and resources if amenable  -pt should be informed he may not drive and [employer] form should be filled out  -if he remains stable into tomorrow AM, can be discharged from Neurology standpoint with outpatient follow-up  -call with questions

## 2021-08-14 NOTE — TELEMEDICINE
TeleConsultation - Neurology   Niki Pompa 60 y o  male MRN: [id number]  Unit/Bed#: -01 Encounter: [id number]    REQUIRED DOCUMENTATION:     1  This service was provided via Telemedicine  2  Provider located at office  3  TeleMed provider: Rukhsana Streeter DO   4  Identify all parties in room with patient during tele consult:  Mark Anthony Snyder, RN  5  Patient was then informed that this was a Telemedicine visit and that the exam was being conducted confidentially over secure lines  My office door was closed  No one else was in the room  Patient acknowledged consent and understanding of privacy and security of the Telemedicine visit, and gave us permission to have the assistant stay in the room in order to assist with the history and to conduct the exam   I informed the patient that I have reviewed their record in Epic and presented the opportunity for them to ask any questions regarding the visit today  The patient agreed to participate  Assessment/Plan     * Seizure [facility])  Assessment & Plan  Recurrent seizures, unclear whether provoked or unprovoked though suspicion for new epilepsy overall low  He denies any cessation of drinking, has no clear withdrawal symptomatology, and timing would not be consistent with a withdrawal seizure, though that said his etoh level was undetectable  He was also in significant heat, and with dehydration that may have provoked seizure along with his etoh use   At this time he is back to baseline without further seizure     -continue neurochecks; notify with changes  -seizure precautions  -HCT reviewed - unremarkable  -may continue Keppra 750mg BID though low suspicion for epilepsy - will likely not need this long-term  -would ideally obtain routine EEG if able - if unable this can be done as an outpatient  -no clear indication for MRI as inpatient but may be done as an outpatient as well  -monitor for alcohol withdrawal  -Ativan PRN for recurrent seizure > 3-5 mins or recurrent events without return to baseline  -if repeat seizure would recommend inpatient EEG and possible transfer if indicated  -education of alcohol cessation and resources if amenable  -pt should be informed he may not drive and [employer] form should be filled out  -if he remains stable into tomorrow AM, can be discharged from Neurology standpoint with outpatient follow-up  -call with questions    Matthews Canavan will need follow up in in 4 weeks with epilepsy attending  He will require a routine EEG within 2-4 weeks  Reason for Consult / Principal Problem: seizure  Hx and PE limited by: N/A    HPI: Matthews Canavan is a 60 y o  male with Etoh abuse who presents with witnessed seizure at home  Pt is known to have chronic alcohol abuse and often drinks a 6 pack of beer a day  He was witnessed to have a seizure at home, described as generalized tonic-clonic, lasting about 2-3 minutes before it self-resolved though with a post-ictal state  Pt was combative with EMS and received Ativan prior to arrival to the ED  He remained post-ictal there and then went on to have a second seizure, witnessed by ED staff and again described as generalized tonic-clonic, lasting about 2-3 minutes before resolution  Was given Ativan and then 1 5g of Keppra  Started on 750mg BID Keppra then admitted for further management  Reportedly he had drunk 4 beers the day of his seizures but etoh was negative in his bloodwork  Head CT in the ED was unremarkable  UDS revealed positive THC  Reportedly had no further seizures overnight  Pt himself has no complaints this AM  He confirms that he did not stop drinking at any point and denies any illicit drug use  He states he had an event of LOC 2-3 years ago attributed to alcohol  He was outside in the heat and did feel off before he had these two seizures this time  Does not feel tremulous or anxious at this time      Inpatient consult to Neurology  Consult performed by: Nghia Perdomo DO  Consult ordered by: Felicia Ortega PA-C         Review of Systems   Neurological: Positive for seizures and light-headedness  All other systems reviewed and are negative  Historical Information   Past Medical History:   Diagnosis Date    Alcohol abuse      History reviewed  No pertinent surgical history  Social History   Social History     Substance and Sexual Activity   Alcohol Use Yes    Alcohol/week: 6 0 standard drinks    Types: 6 Cans of beer per week    Comment: reported dring mostly every day especially on the weekend      Social History     Substance and Sexual Activity   Drug Use Never     E-Cigarette/Vaping    E-Cigarette Use Never User      E-Cigarette/Vaping Substances    Nicotine No     THC No     CBD No     Flavoring No     Other No     Unknown No      Social History     Tobacco Use   Smoking Status Current Every Day Smoker    Packs/day: 1 00    Types: Cigarettes   Smokeless Tobacco Current User   Tobacco Comment    1 pack per day      Family History: History reviewed  No pertinent family history  Review of previous medical records was completed      Meds/Allergies   all current active meds have been reviewed, current meds:   Current Facility-Administered Medications   Medication Dose Route Frequency    acetaminophen (TYLENOL) tablet 650 mg  650 mg Oral B9M PRN    folic acid 1 mg, thiamine (VITAMIN B1) 100 mg in sodium chloride 0 9 % 100 mL IV piggyback   Intravenous Daily    levETIRAcetam (KEPPRA) tablet 750 mg  750 mg Oral Q12H [facility]    LORazepam (ATIVAN) injection 2 mg  2 mg Intravenous Q6H PRN    magnesium sulfate 4 g/100 mL IVPB (premix) 4 g  4 g Intravenous Once    nicotine (NICODERM CQ) 14 mg/24hr TD 24 hr patch 1 patch  1 patch Transdermal Daily    potassium chloride 20 mEq IVPB (premix)  20 mEq Intravenous Once    sodium chloride 0 9 % infusion  75 mL/hr Intravenous Continuous    and PTA meds:   None       No Known Allergies    Objective   Vitals:Blood pressure 161/92, pulse 76, temperature 98 4 °F (36 9 °C), temperature source Tympanic, resp  rate 17, height 5' 11" (1 803 m), weight 68 9 kg (151 lb 14 4 oz), SpO2 99 %  ,Body mass index is 21 19 kg/m²  Intake/Output Summary (Last 24 hours) at 8/14/2021 1108  Last data filed at 8/14/2021 0601  Gross per 24 hour   Intake 1563 33 ml   Output 400 ml   Net 1163 33 ml       Invasive Devices: Invasive Devices     Peripheral Intravenous Line            Peripheral IV 08/13/21 Left Antecubital <1 day    Peripheral IV 08/13/21 Right Hand <1 day                Physical Exam  Constitutional:       Appearance: He is well-developed  HENT:      Head: Normocephalic and atraumatic  Eyes:      Extraocular Movements: EOM normal    Neurological:      Mental Status: He is alert and oriented to person, place, and time  Deep Tendon Reflexes: Strength normal    Psychiatric:         Speech: Speech normal        Neurologic Exam     Mental Status   Oriented to person, place, and time  Attention: normal    Speech: speech is normal   Level of consciousness: alert  Able to name object  Able to repeat  Normal comprehension  At times slow to respond/process     Cranial Nerves     CN II   Visual fields full to confrontation  CN III, IV, VI   Extraocular motions are normal      CN V   Facial sensation intact  CN VII   Facial expression full, symmetric  CN XII   Tongue deviation: none    Motor Exam     Strength   Strength 5/5 throughout       Sensory Exam   Light touch normal      Gait, Coordination, and Reflexes Slight dysmetria in bilateral UEs       Lab Results:   CBC:   Results from last 7 days   Lab Units 08/14/21  0530 08/13/21  1735   WBC Thousand/uL 5 96 6 00   RBC Million/uL 3 74* 3 94   HEMOGLOBIN g/dL 12 2 12 7   HEMATOCRIT % 35 9* 37 3   MCV fL 96 95   PLATELETS Thousands/uL 179 201   , BMP/CMP:   Results from last 7 days   Lab Units 08/14/21  0531 08/13/21  1735   SODIUM mmol/L 139 138   POTASSIUM mmol/L 3 9 3 4*   CHLORIDE mmol/L 105 102   CO2 mmol/L 25 22   BUN mg/dL 9 12   CREATININE mg/dL 0 88 1 28*   CALCIUM mg/dL 7 8* 8 7   AST U/L  --  48*   ALT U/L  --  29   ALK PHOS U/L  --  75 2   EGFR ml/min/1 73sq m 95 62   , Drug Screen:   Results from last 7 days   Lab Units 08/13/21  1834   BARBITURATE UR  Negative   BENZODIAZEPINE UR  Negative   THC UR  Positive*   COCAINE UR  Negative   METHADONE URINE  Negative   OPIATE UR  Negative   PCP UR  Negative     Imaging Studies: I have personally reviewed pertinent films in PACS  EKG, Pathology, and Other Studies: I have personally reviewed pertinent reports      VTE Prophylaxis: Sequential compression device (Venodyne)     Code Status: Level 1 - Full Code

## 2021-08-14 NOTE — PLAN OF CARE
Problem: MOBILITY - ADULT  Goal: Maintain or return to baseline ADL function  Description: INTERVENTIONS:  -  Assess patient's ability to carry out ADLs; assess patient's baseline for ADL function and identify physical deficits which impact ability to perform ADLs (bathing, care of mouth/teeth, toileting, grooming, dressing, etc )  - Assess/evaluate cause of self-care deficits   - Assess range of motion  - Assess patient's mobility; develop plan if impaired  - Assess patient's need for assistive devices and provide as appropriate  - Encourage maximum independence but intervene and supervise when necessary  - Involve family in performance of ADLs  - Assess for home care needs following discharge   - Consider OT consult to assist with ADL evaluation and planning for discharge  - Provide patient education as appropriate  Outcome: Not Progressing,new admit  Still need 2 heavy assist

## 2021-08-14 NOTE — ASSESSMENT & PLAN NOTE
· Reported that patient drinks 6 pack daily  Drank 4 beers today   ETOH negative on admission   · CIWA protocol   · MV, Thiamine, Folic Acid IV due to post-ictal state

## 2021-08-14 NOTE — ASSESSMENT & PLAN NOTE
· Reported that patient drinks 6 pack daily    · CIWA protocol   · MV, Thiamine, Folic Acid   · Alcohol cessation discussed

## 2021-08-14 NOTE — ASSESSMENT & PLAN NOTE
· Prior to admission as well as witnessed in the ED  Prior seizure history noted  Does not appear to be on medications  Daily drinker ? ETOH withdrawal  Presently post-ictal  CT head negative   · Admit patient to med/surg under inpatient status   · Consult Neurology   · Status post Keppra 1,500 mg BID   · Continue Keppra 750 mg BID thereafter  · Seizure precautions   · On CIWA  · Telemetry

## 2021-08-14 NOTE — ASSESSMENT & PLAN NOTE
Admitted seizures  Neurology input noted - Provoked versus unprovoked  Continue Keppra  Seizure precautions, Atlainey bryant

## 2021-08-14 NOTE — UTILIZATION REVIEW
Initial Clinical Review    Admission: Date/Time/Statement:   Admission Orders (From admission, onward)     Ordered        08/13/21 [address]  Once                   Orders Placed This Encounter   Procedures    INPATIENT ADMISSION     Standing Status:   Standing     Number of Occurrences:   1     Order Specific Question:   Level of Care     Answer:   Med Surg [16]     Order Specific Question:   Estimated length of stay     Answer:   More than 2 Midnights     Order Specific Question:   Certification     Answer:   I certify that inpatient services are medically necessary for this patient for a duration of greater than two midnights  See H&P and MD Progress Notes for additional information about the patient's course of treatment  ED Arrival Information     Expected Arrival Acuity    - 8/13/2021 17:22 Urgent         Means of arrival Escorted by Service Admission type    Ambulance [facility] Urgent         Arrival complaint    Seizure         Chief Complaint   Patient presents with    Seizure - Prior Hx Of     wittness seizure, c/o right elbow discomfort, LOC, reported last seizure 3years ago   denies hitting head, denies c-s tenderness        Initial Presentation: 61 yo male presented to ED from home via EMS as inpatient admission for seizures  Patient drinks a 6 pack of beer daily all already had 4  Seizures were witnessed by nephew, reportedly GTC and lasting approximately 2 minutes with fairly quick return to baseline  In ED (+) seizure post ictal confused, very agitated IV ativan given and loading doses kepppra done   Plan monitor E-ltyes and supportive care         ED Triage Vitals [08/13/21 1728]   Temperature Pulse Respirations Blood Pressure SpO2   98 3 °F (36 8 °C) 98 16 141/91 97 %      Temp Source Heart Rate Source Patient Position - Orthostatic VS BP Location FiO2 (%)   Oral Monitor Lying Right arm --      Pain Score       No Pain          Wt Readings from Last 1 Encounters:   08/13/21 68 9 kg (151 lb 14 4 oz)     Additional Vital Signs:   Date/Time  Temp  Pulse  Resp  BP  MAP (mmHg)  SpO2  O2 Device  Patient Position - Orthostatic VS   08/14/21 0732  98 4 °F (36 9 °C)  76  17  161/92  136  99 %  None (Room air)  Lying   08/14/21 0559  --  74  --  166/96  --  --  --  --   08/14/21 0200  --  85  --  142/68  --  --  --  --   08/14/21 0110  --  84  --  --  --  97 %  None (Room air)  --   08/13/21 2212  99 9 °F (37 7 °C)  86  18  151/57  112  93 %  None (Room air)  Lying   08/13/21 2120  --  --  --  --  --  97 %  None (Room air)  --   08/13/21 2100  --  96  --  153/82  --  --  --  --   08/13/21 2055  99 °F (37 2 °C)  94  18  155/88  --  97 %  None (Room air)  Lying   08/13/21 2030  --  --  --  --  --  --  None (Room air)  --   08/13/21 1911  --  --  22  168/101Abnormal   --  97 %   Nasal cannula  Lying   SpO2: on 2l o2 at 08/13/21 1911 08/13/21 1745  --  --  --  --  --  --  None (Room air)         Pertinent Labs/Diagnostic Test Results:       Results from last 7 days   Lab Units 08/14/21  0530 08/13/21  1735   WBC Thousand/uL 5 96 6 00   HEMOGLOBIN g/dL 12 2 12 7   HEMATOCRIT % 35 9* 37 3   PLATELETS Thousands/uL 179 201   NEUTROS ABS Thousands/µL  --  4 30         Results from last 7 days   Lab Units 08/14/21  0531 08/13/21  1735   SODIUM mmol/L 139 138   POTASSIUM mmol/L 3 9 3 4*   CHLORIDE mmol/L 105 102   CO2 mmol/L 25 22   ANION GAP mmol/L 9 14*   BUN mg/dL 9 12   CREATININE mg/dL 0 88 1 28*   EGFR ml/min/1 73sq m 95 62   CALCIUM mg/dL 7 8* 8 7   MAGNESIUM mg/dL 1 5* 0 9*     Results from last 7 days   Lab Units 08/13/21  1735   AST U/L 48*   ALT U/L 29   ALK PHOS U/L 75 2   TOTAL PROTEIN g/dL 6 9   ALBUMIN g/dL 3 7   TOTAL BILIRUBIN mg/dL 1 15         Results from last 7 days   Lab Units 08/14/21  0531 08/13/21  1735   GLUCOSE RANDOM mg/dL 73 88         Results from last 7 days   Lab Units 08/13/21  1735   TSH 3RD GENERATON uIU/mL 3 595       Results from last 7 days   Lab Units 08/13/21  1834   AMPH/METH  Negative   BARBITURATE UR  Negative   BENZODIAZEPINE UR  Negative   COCAINE UR  Negative   METHADONE URINE  Negative   OPIATE UR  Negative   PCP UR  Negative   THC UR  Positive*     Results from last 7 days   Lab Units 08/13/21  1735   ETHANOL LVL mg/dL <10   ACETAMINOPHEN LVL ug/mL <95*   SALICYLATE LVL mg/dL <2 2*     CT head 08-13-21   No acute intracranial abnormality  Scalp soft tissue swelling at the vertex on the right without underlying fracture       EKG 08-13-21  NSR              ED Treatment:   Medication Administration from 08/13/2021 1722 to 08/13/2021 2054       Date/Time Order Dose Route Action     08/13/2021 1738 sodium chloride 0 9 % bolus 1,000 mL 1,000 mL Intravenous New Bag     08/13/2021 1835 potassium chloride (K-DUR,KLOR-CON) CR tablet 40 mEq 40 mEq Oral Given     08/13/2021 1835 magnesium sulfate 2 g/50 mL IVPB (premix) 2 g 2 g Intravenous New Bag     08/13/2021 1835 magnesium oxide (MAG-OX) tablet 800 mg 800 mg Oral Given     08/13/2021 1841 LORazepam (ATIVAN) 2 mg/mL injection **ADS Override Pull** 2 mg  Given     08/13/2021 1929 LORazepam (ATIVAN) injection 2 mg 2 mg Intravenous Given     08/13/2021 1857 levETIRAcetam (KEPPRA) 1,500 mg in sodium chloride 0 9 % 100 mL IVPB 1,500 mg Intravenous New Bag     08/13/2021 1916 potassium chloride 20 mEq IVPB (premix) 20 mEq Intravenous New Bag        Past Medical History:   Diagnosis Date    Alcohol abuse      Present on Admission:   Alcohol abuse   Hypokalemia   Hypomagnesemia      Admitting Diagnosis: Dehydration [E86 0]  Hypokalemia [E87 6]  Hypomagnesemia [E83 42]  Seizure ([address] ) [R56 9]  Age/Sex: 60 y o  male  Admission Orders:  Scheduled Medications:  folic acid 1 mg, thiamine 100 mg in 0 9% sodium chloride 100 mL IVPB, , Intravenous, Daily  levETIRAcetam, 750 mg, Oral, Q12H [address]  nicotine, 1 patch, Transdermal, Daily      Continuous IV Infusions:  sodium chloride, 125 mL/hr, Intravenous, Continuous      PRN Meds:  acetaminophen, 650 mg, Oral, Q6H PRN  LORazepam, 2 mg, Intravenous, Q6H PRN        IP CONSULT TO NEUROLOGY   Non violent restraints   laureen precaution  Aspiration precaution   Telemetry  SCD  Continuous pulse oximetry  CIWA score 4-5-6-14      Network Utilization Review Department  ATTENTION: Please call with any questions or concerns to [phone] and carefully listen to the prompts so that you are directed to the right person  All voicemails are confidential   Ashley List all requests for admission clinical reviews, approved or denied determinations and any other requests to dedicated fax number below belonging to the campus where the patient is receiving treatment   List of dedicated fax numbers for the Facilities:  [address] DENIALS (Administrative/Medical Necessity) [phone]   [address] (Maternity/NICU/Pediatrics) [phone]   [address] [facility] [place] 1000 [address] [place] [address] [address] [facility] [place] [address] [phone]

## 2021-08-15 VITALS
DIASTOLIC BLOOD PRESSURE: 98 MMHG | HEIGHT: 71 IN | HEART RATE: 71 BPM | OXYGEN SATURATION: 97 % | RESPIRATION RATE: 17 BRPM | WEIGHT: 151.9 LBS | BODY MASS INDEX: 21.27 KG/M2 | TEMPERATURE: 98.3 F | SYSTOLIC BLOOD PRESSURE: 141 MMHG

## 2021-08-15 LAB
ANION GAP SERPL CALCULATED.3IONS-SCNC: 9 MMOL/L (ref 4–13)
BUN SERPL-MCNC: 9 MG/DL (ref 6–20)
CALCIUM SERPL-MCNC: 7.8 MG/DL (ref 8.4–10.2)
CHLORIDE SERPL-SCNC: 102 MMOL/L (ref 96–108)
CO2 SERPL-SCNC: 26 MMOL/L (ref 22–33)
CREAT SERPL-MCNC: 0.77 MG/DL (ref 0.5–1.2)
GFR SERPL CREATININE-BSD FRML MDRD: 101 ML/MIN/1.73SQ M
GLUCOSE SERPL-MCNC: 86 MG/DL (ref 65–140)
MAGNESIUM SERPL-MCNC: 1.2 MG/DL (ref 1.6–2.6)
POTASSIUM SERPL-SCNC: 3.3 MMOL/L (ref 3.5–5)
SODIUM SERPL-SCNC: 137 MMOL/L (ref 133–145)

## 2021-08-15 PROCEDURE — 80048 BASIC METABOLIC PNL TOTAL CA: CPT | Performed by: INTERNAL MEDICINE

## 2021-08-15 PROCEDURE — 83735 ASSAY OF MAGNESIUM: CPT | Performed by: INTERNAL MEDICINE

## 2021-08-15 PROCEDURE — 99239 HOSP IP/OBS DSCHRG MGMT >30: CPT | Performed by: INTERNAL MEDICINE

## 2021-08-15 RX ORDER — LEVETIRACETAM 750 MG/1
750 TABLET ORAL EVERY 12 HOURS SCHEDULED
Qty: 60 TABLET | Refills: 0 | Status: SHIPPED | OUTPATIENT
Start: 2021-08-15 | End: 2021-09-14

## 2021-08-15 RX ORDER — MAGNESIUM SULFATE HEPTAHYDRATE 40 MG/ML
4 INJECTION, SOLUTION INTRAVENOUS ONCE
Status: COMPLETED | OUTPATIENT
Start: 2021-08-15 | End: 2021-08-15

## 2021-08-15 RX ORDER — POTASSIUM CHLORIDE 20 MEQ/1
40 TABLET, EXTENDED RELEASE ORAL ONCE
Status: COMPLETED | OUTPATIENT
Start: 2021-08-15 | End: 2021-08-15

## 2021-08-15 RX ADMIN — LEVETIRACETAM 750 MG: 500 TABLET, FILM COATED ORAL at 08:50

## 2021-08-15 RX ADMIN — LORAZEPAM 2 MG: 2 INJECTION, SOLUTION INTRAMUSCULAR; INTRAVENOUS at 01:22

## 2021-08-15 RX ADMIN — POTASSIUM CHLORIDE 40 MEQ: 1500 TABLET, EXTENDED RELEASE ORAL at 08:50

## 2021-08-15 RX ADMIN — MAGNESIUM SULFATE HEPTAHYDRATE 4 G: 40 INJECTION, SOLUTION INTRAVENOUS at 08:50

## 2021-08-15 RX ADMIN — Medication 1 PATCH: at 08:50

## 2021-08-15 RX ADMIN — THIAMINE HYDROCHLORIDE: 100 INJECTION, SOLUTION INTRAMUSCULAR; INTRAVENOUS at 11:17

## 2021-08-15 RX ADMIN — MAGNESIUM OXIDE TAB 400 MG (241.3 MG ELEMENTAL MG) 400 MG: 400 (241.3 MG) TAB at 08:50

## 2021-08-15 NOTE — DISCHARGE SUMMARY
Latrice U  64   Discharge- Uma Perkins 1965, 60 y o  male MRN: [id number]  Unit/Bed#: -01 Encounter: [id number]  Primary Care Provider: No primary care provider on file  Date and time admitted to hospital: 8/13/2021  5:22 PM    * Seizure [facility])  Assessment & Plan  Admitted with seizures  Neurology input noted - Provoked versus unprovoked  Continue Keppra  Seizure precautions, Ativan p r n  Patient is instructed not to drive till seen in Neurology office - he is agreeable  Outpatient follow-up with Neurology    Hypomagnesemia  Assessment & Plan  · Replete  · Monitor    Hypokalemia  Assessment & Plan  · Replete  · Monitor    Alcohol abuse  Assessment & Plan  · Reported that patient drinks 6 pack daily  · CIWA protocol   · MV, Thiamine, Folic Acid   · Alcohol cessation discussed          Discharge Summary - [facility]    Patient Information: Uma Perkins 60 y o  male MRN: [id number]  Unit/Bed#: -01 Encounter: [id number]    Discharging Physician / Practitioner: Sol Paul MD  PCP: No primary care provider on file  Admission Date: 8/13/2021  Discharge Date: 08/15/21    Disposition:     Home    Reason for Admission:  Seizures      Discharge Diagnoses:     Principal Problem:    Seizure [facility])  Active Problems:    Alcohol abuse    Hypokalemia    Hypomagnesemia  Resolved Problems:    * No resolved hospital problems  *      Consultations During Hospital Stay:  · Neurology    Procedures Performed:     · CT head no acute intracranial abnormality, scalp soft tissue swelling at the vertex on the right without underlying fracture      Hospital Course:     Uma Perkins is a 60 y o  male patient who originally presented to the hospital on 8/13/2021 due to seizure  Patient with history of chronic alcohol abuse, presented with seizure  He was seen in consultation with Neurology, he was placed on Keppra 750 mg b i d    He did not have any seizure-like activity during his brief hospital stay  He has electrolytes were monitored and repleted  He reports feeling better today  Alcohol abstinence discussed in detail with him he verbalized understanding  He reports he does not drive however stressed not to drive till seen by Neurology in the office he is agreeable  He is symptomatically improved hemodynamically stable and is deemed ready for discharge today  Kindly review the chart for details  Condition at Discharge: fair     Discharge Day Visit / Exam:     Subjective:      Comfortably in bed  Reports feeling better  Agreeable to discharge plan  Alcohol abstinence discussed    Vitals: Blood Pressure: 141/98 (08/15/21 0732)  Pulse: 71 (08/15/21 0732)  Temperature: 98 3 °F (36 8 °C) (08/15/21 0732)  Temp Source: Tympanic (08/15/21 0732)  Respirations: 17 (08/15/21 0732)  Height: 5' 11" (180 3 cm) (08/13/21 2055)  Weight - Scale: 68 9 kg (151 lb 14 4 oz) (08/13/21 2055)  SpO2: 97 % (08/15/21 0732)  Exam:   Physical Exam    Comfortably in bed  Neck supple  Lungs diminished breath sounds bilateral  Heart sounds S1-S2 noted  Abdomen soft  Awake obeys simple commands  Pulses noted  No rash    Discharge instructions/Information to patient and family:   See after visit summary for information provided to patient and family  Discharge plan discussed with the patient, reports he is keeping his family updated  Outpatient close follow-up with Neurology, primary care physician    Provisions for Follow-Up Care:  See after visit summary for information related to follow-up care and any pertinent home health orders  Planned Readmission: no     Discharge Statement:  I spent 45 minutes discharging the patient  This time was spent on the day of discharge  I had direct contact with the patient on the day of discharge   Greater than 50% of the total time was spent examining patient, answering all patient questions, arranging and discussing plan of care with patient as well as directly providing post-discharge instructions  Additional time then spent on discharge activities  Discharge Medications:  See after visit summary for reconciled discharge medications provided to patient and family        ** Please Note: This note has been constructed using a voice recognition system **

## 2021-08-15 NOTE — PLAN OF CARE
Problem: NEUROSENSORY - ADULT  Goal: Achieves stable or improved neurological status  Description: INTERVENTIONS  - Monitor and report changes in neurological status  - Monitor vital signs such as temperature, blood pressure, glucose, and any other labs ordered   - Initiate measures to prevent increased intracranial pressure  - Monitor for seizure activity and implement precautions if appropriate      Outcome: Progressing  Goal: Remains free of injury related to seizures activity  Description: INTERVENTIONS  - Maintain airway, patient safety  and administer oxygen as ordered  - Monitor patient for seizure activity, document and report duration and description of seizure to physician/advanced practitioner  - If seizure occurs,  ensure patient safety during seizure  - Reorient patient post seizure  - Seizure pads on all 4 side rails  - Instruct patient/family to notify RN of any seizure activity including if an aura is experienced  - Instruct patient/family to call for assistance with activity based on nursing assessment  - Administer anti-seizure medications if ordered    Outcome: Progressing  Goal: Achieves maximal functionality and self care  Description: INTERVENTIONS  - Monitor swallowing and airway patency with patient fatigue and changes in neurological status  - Encourage and assist patient to increase activity and self care     - Encourage visually impaired, hearing impaired and aphasic patients to use assistive/communication devices  Outcome: Progressing     Problem: DISCHARGE PLANNING  Goal: Discharge to home or other facility with appropriate resources  Description: INTERVENTIONS:  - Identify barriers to discharge w/patient and caregiver  - Arrange for needed discharge resources and transportation as appropriate  - Identify discharge learning needs (meds, wound care, etc )  - Arrange for interpretive services to assist at discharge as needed  - Refer to Case Management Department for coordinating This office note has been dictated.     discharge planning if the patient needs post-hospital services based on physician/advanced practitioner order or complex needs related to functional status, cognitive ability, or social support system  Outcome: Progressing

## 2021-08-15 NOTE — ASSESSMENT & PLAN NOTE
Admitted with seizures  Neurology input noted - Provoked versus unprovoked  Continue Keppra  Seizure precautions, Ativan p r n    Patient is instructed not to drive till seen in Neurology office - he is agreeable  Outpatient follow-up with Neurology

## 2021-08-16 ENCOUNTER — TELEPHONE (OUTPATIENT)
Dept: NEUROLOGY | Facility: CLINIC | Age: 56
End: 2021-08-16

## 2021-08-16 NOTE — TELEPHONE ENCOUNTER
Sched HFU appt 1/3/2022 with Dr Dafne Meredith in [place]  I placed pt on wait list for an earlier appt  Pt was ok with Jan 2022 appt  Transferred pt to  to sched EEG  Mailed NP paperwork to pt's home  [employer]/SZ/Self Pay    NOTE FROM CHART:  Reason for Consult / Principal Problem: seizure  Maame Boateng will need follow up in in 4 weeks with epilepsy attending  He will require a routine EEG within 2-4 weeks

## 2021-08-20 LAB
ATRIAL RATE: 94 BPM
ATRIAL RATE: 94 BPM
P AXIS: 70 DEGREES
P AXIS: 70 DEGREES
PR INTERVAL: 169 MS
PR INTERVAL: 169 MS
QRS AXIS: 50 DEGREES
QRS AXIS: 50 DEGREES
QRSD INTERVAL: 93 MS
QRSD INTERVAL: 93 MS
QT INTERVAL: 366 MS
QT INTERVAL: 366 MS
QTC INTERVAL: 458 MS
QTC INTERVAL: 458 MS
T WAVE AXIS: 68 DEGREES
T WAVE AXIS: 68 DEGREES
VENTRICULAR RATE: 94 BPM
VENTRICULAR RATE: 94 BPM

## 2021-08-20 PROCEDURE — 93010 ELECTROCARDIOGRAM REPORT: CPT | Performed by: INTERNAL MEDICINE

## 2021-10-20 NOTE — UTILIZATION REVIEW
URGENT/EMERGENT  INPATIENT/SPU AUTHORIZATION REQUEST    Date: 10/20/21            # Pages in this Request:     x New Request   Additional Information for PA#:     Office Contact Name:  Josephine Curran Title: Utilization Review, [facility] Nurse     Phone: [phone]  Ext  Availability (Date/Time): Wednesday - Friday 8 am- 4 pm    x Inpatient Review  SPU Review        Current       x Late Pick-up   · How your facility was first notified of the Late Pick-up: PATHS  · When your facility was first notified of the Late Pick-up (date): 10/15         RECIPIENT INFORMATION    Recipient FL#:[id number]   Recipient Name: Jo Ann Garza    YOB: 1965  60 y o  Recipient Alias:     Gender:  X Male  Female Medicaid Eligibility ([address] [place] [address]): INSURANCE INFORMATION    (All other private or governmental health insurance benefits must be utilized prior to billing the [place] Program)    Was this admission the result of an MVA, other accident, assault, injury, fall, gunshot, bite etc ? Yes X No                   If yes, provide a brief description of the incident  Does the recipient have other insurance coverage? Yes X No        Insurance Company Name/Policy #      Did that insurance pay on this claim? Yes  No        Did that insurance deny this claim? Yes  No    If yes, reason for denial:      Does the recipient have Medicare? Yes X No        Did Medicare exhaust prior to this admission? Yes  No            Did Medicare partially pay this claim? Yes  No        Did that insurance deny this claim? Yes  No    If yes, reason for denial:          Was the recipient a prisoner at the time of admission?    Yes X No            PROVIDER INFORMATION    Hospital Name: [facility] [address] Provider ID#: [id number]    [address] [place] [address] [facility] Provider ID#: [id number]        ADMISSION INFORMATION    Type of Admission: (please choose one)    X ED      Direct    If yes, from where? Transfer    If yes, transferring hospital (inpatient, rehab, or psych) Provider Name/Provider ID#: Admission Floor or Unit Type: MED-SURG    Dates/Times:        ED Date/Time: 8/13/2021  17:22         Observation Date/Time:         Admission Date/Time: 8/13/21  19:59 PM        Discharge or Transfer Date/Time: 8/15/2021 1007        DIAGNOSIS/PROCEDURE CODES    Primary Diagnosis Code/Primary Diagnosis Code description:   Dehydration [E86 0]  Hypokalemia [E87 6]  Hypomagnesemia [E83 42]  Seizure ([facility] [address] ) [R56 9]  (MAY RE-ORDER DX CODES)    Additional Diagnosis Code(s) and Description(s)-(up to three additional codes):     Procedure Code (one) and description:NONE        CLINICAL INFORMATION - PRIOR ADMISSION ONLY    Is there a prior admission with a discharge date within 30 days of the date of this admission? X No (Proceed to the next section - "Clinical Information - General Review Checklist:)      Yes (Provide the following information)     Prior admission dates:    MA Prior Authorization Number:        Review Outcome:     Diagnosis Code(s)/Description:    Procedure Code/Description:    Findings:    Treatment:    Condition on Discharge:   Vitals:    Labs:   Imaging:   Medications: Follow-up Instructions:    Disposition:        CLINICAL INFORMATION - GENERAL REVIEW CHECKLIST    EMERGENCY DEPARTMENT: (Proceed to "ADMISSION" if Direct Admission)    Presenting Signs/Symptoms:    Seizure - Prior Hx Of       wittness seizure, c/o right elbow discomfort, LOC, reported last seizure 3years ago   denies hitting head, denies c-s tenderness          Initial Presentation: 61 yo male presented to ED from home via EMS as inpatient admission for seizures  Patient drinks a 6 pack of beer daily all already had 4  Seizures were witnessed by nephew, reportedly GTC and lasting approximately 2 minutes with fairly quick return to baseline  In ED (+) seizure post ictal confused, very agitated IV ativan given and loading doses kepppra done   Plan monitor E-ltyes and supportive care       Medication/treatment prior to arrival in the ED:     Past Medical History:     Past Medical History:   Diagnosis Date    Alcohol abuse        Clinical Exam:     Initial Vital Signs: (Temp, Pulse, Resp, and BP)   ED Triage Vitals [08/13/21 1728]   Temperature Pulse Respirations Blood Pressure SpO2   98 3 °F (36 8 °C) 98 16 141/91 97 %      Temp Source Heart Rate Source Patient Position - Orthostatic VS BP Location FiO2 (%)   Oral Monitor Lying Right arm --      Pain Score       No Pain           Pertinent Repeat Vital Signs: (include times they were obtained)  08/14/21 0732   98 4 °F (36 9 °C)   76   17   161/92   136   99 %   None (Room air)   Lying   08/14/21 0559   --   74   --   166/96   --   --   --   --   08/14/21 0200   --   85   --   142/68   --   --   --   --   08/14/21 0110   --   84   --   --   --   97 %   None (Room air)   --   08/13/21 2212   99 9 °F (37 7 °C)   86   18   151/57   112   93 %   None (Room air)   Lying   08/13/21 2120   --   --   --   --   --   97 %   None (Room air)   --   08/13/21 2100   --   96   --   153/82   --   --   --   --   08/13/21 2055   99 °F (37 2 °C)   94   18   155/88   --   97 %   None (Room air)   Lying   08/13/21 2030   --   --   --   --   --   --   None (Room air)   --   08/13/21 1911   --   --   22   168/101Abnormal    --   97 %    Nasal cannula   Lying   SpO2: on 2l o2 at 08/13/21 1911 08/13/21 1745   --   --   --   --   --   --   None (Room air)           Pertinent Sustained Findings: (include times they were obtained)    Weight in Kilograms:  Wt Readings from Last 1 Encounters:   08/13/21 68 9 kg (151 lb 14 4 oz)       Pertinent Labs (results):  Results from last 7 days   Lab Units 08/14/21  0530 08/13/21  1735   WBC Thousand/uL 5 96 6 00   HEMOGLOBIN g/dL 12 2 12 7   HEMATOCRIT % 35 9* 37 3   PLATELETS Thousands/uL 179 201   NEUTROS ABS Thousands/µL  --  4 30                Results from last 7 days   Lab Units 08/14/21  0531 08/13/21  1735   SODIUM mmol/L 139 138   POTASSIUM mmol/L 3 9 3 4*   CHLORIDE mmol/L 105 102   CO2 mmol/L 25 22   ANION GAP mmol/L 9 14*   BUN mg/dL 9 12   CREATININE mg/dL 0 88 1 28*   EGFR ml/min/1 73sq m 95 62   CALCIUM mg/dL 7 8* 8 7   MAGNESIUM mg/dL 1 5* 0 9*           Results from last 7 days   Lab Units 08/13/21  1735   AST U/L 48*   ALT U/L 29   ALK PHOS U/L 75 2   TOTAL PROTEIN g/dL 6 9   ALBUMIN g/dL 3 7   TOTAL BILIRUBIN mg/dL 1 15                Results from last 7 days   Lab Units 08/14/21  0531 08/13/21  1735   GLUCOSE RANDOM mg/dL 73 88               Results from last 7 days   Lab Units 08/13/21  1735   TSH 3RD GENERATON uIU/mL 3 595            Results from last 7 days   Lab Units 08/13/21  1834   AMPH/METH   Negative   BARBITURATE UR   Negative   BENZODIAZEPINE UR   Negative   COCAINE UR   Negative   METHADONE URINE   Negative   OPIATE UR   Negative   PCP UR   Negative   THC UR   Positive*           Results from last 7 days   Lab Units 08/13/21  1735   ETHANOL LVL mg/dL <10   ACETAMINOPHEN LVL ug/mL <30*   SALICYLATE LVL mg/dL <2 1*       Radiology (results):  CT head 08-13-21   No acute intracranial abnormality  Scalp soft tissue swelling at the vertex on the right without underlying fracture     EKG (results):   EKG 08-13-21  NSR  Other tests (results):    Tests pending final results:    Treatment in the ED:   Medication Administration from 08/13/2021 1722 to 08/13/2021 2054       Date/Time Order Dose Route Action Action by Comments                08/13/2021 1738 sodium chloride 0 9 % bolus 1,000 mL 1,000 mL Intravenous New Bag       08/13/2021 1835 potassium chloride (K-DUR,KLOR-CON) CR tablet 40 mEq 40 mEq Oral Given                  08/13/2021 1835 magnesium sulfate 2 g/50 mL IVPB (premix) 2 g 2 g Intravenous New Bag       08/13/2021 1835 magnesium oxide (MAG-OX) tablet 800 mg 800 mg Oral Given       08/13/2021 1841 LORazepam (ATIVAN) 2 mg/mL injection **ADS Override Pull** 2 mg  Given       08/13/2021 1929 LORazepam (ATIVAN) injection 2 mg 2 mg Intravenous Given                  08/13/2021 1857 levETIRAcetam (KEPPRA) 1,500 mg in sodium chloride 0 9 % 100 mL IVPB 1,500 mg Intravenous New Bag       08/13/2021 1916 potassium chloride 20 mEq IVPB (premix) 20 mEq Intravenous New Bag             Meds: Name, dose, route, time, number of doses given        Nebulizer treatments: Type, total number given      IVs: Type, rate, total amt  given          Other treatments:       Change in condition while in the ED:       Response to ED Treatment:           OBSERVATION: (Proceed to "ADMISSION" if Direct Admission)    Orders written during the observation period  Meds Name, dose, route, time, how may doses given:  PRN Meds Name, dose, route, time, how many doses given within the first 24 hrs :  IVs Type, rate, and total amt  ordered/given:  Labs, imaging, other:  Consults and findings:    Test Results during the observation period  Pertinent Lab tests (dates/results):  Culture results (blood, urine, spinal, wound, respiratory, etc ):  Imaging tests (dates/results):  EKG (dates/results):   Other test (dates/results):  Tests pending (dates/results):    Surgical or Invasive Procedures during the observation period  Name of surgery/procedure:  Date & Time:  Patient Response:  Post-operative orders:  Operative Report/Findings:    Response to Treatment, Major Change in Condition, Major Charge in Treatment during the observation period          ADMISSION:    DIRECT Admissions Only:    · Presenting Signs/Symptoms:   ·   · Medication/treatment prior to arrival:  ·   · Past Medical History:  ·   · Clinical Exam on admission:  ·   · Vital Signs on admission: (Temp, Pulse, Resp, and BP)  ·   · Weight in kilograms:     ALL Admissions:    Admission Orders and Other Orders written within the first 24 hrs after admission  IP CONSULT TO NEUROLOGY   Non violent jaja garduno precaution  Aspiration precaution   Telemetry  SCD  Continuous pulse oximetry  CIWA score 4-5-6-14      Meds Name, dose, route, time, how may doses given:  folic acid 1 mg, thiamine 100 mg in 0 9% sodium chloride 100 mL IVPB, , Intravenous, Daily  levETIRAcetam, 750 mg, Oral, Q12H SONIYA  nicotine, 1 patch, Transdermal, Daily        Continuous IV Infusions:  sodium chloride, 125 mL/hr, Intravenous, Continuous        PRN Meds:  acetaminophen, 650 mg, Oral, Q6H PRN  LORazepam, 2 mg, Intravenous, Q6H PRN    PRN Meds Name, dose, route, time, how many doses given within the first 24 hrs :  IVs Type, rate, and total amt  ordered/given:  Labs, imaging, other:      Consults and findings:  Seizure ([address] )  Assessment & Plan  · Prior to admission as well as witnessed in the ED  Prior seizure history noted  Does not appear to be on medications  Daily drinker ? ETOH withdrawal  Presently post-ictal  CT head negative   ? Admit patient to med/surg under inpatient status   ? Consult Neurology   ? Status post Keppra 1,500 mg BID   ? Continue Keppra 750 mg BID thereafter  ? Seizure precautions   ? On CIWA  ? Telemetry      Alcohol abuse  Assessment & Plan  · Reported that patient drinks 6 pack daily  Drank 4 beers today  ETOH negative on admission   ? CIWA protocol   ? MV, Thiamine, Folic Acid IV due to post-ictal state      Hypokalemia  Assessment & Plan  · Noted at 3 4  Status post repletion in the ED  ? BMP in AM      Hypomagnesemia  Assessment & Plan  · Noted at 0 9, status post repletion in the ED  ? Mag in AM     TeleConsultation - Neurology     * Seizure [facility])  Assessment & Plan  Recurrent seizures, unclear whether provoked or unprovoked though suspicion for new epilepsy overall low  He denies any cessation of drinking, has no clear withdrawal symptomatology, and timing would not be consistent with a withdrawal seizure, though that said his etoh level was undetectable   He was also in significant heat, and with dehydration that may have provoked seizure along with his etoh use  At this time he is back to baseline without further seizure      -continue neurochecks; notify with changes  -seizure precautions  -HCT reviewed - unremarkable  -may continue Keppra 750mg BID though low suspicion for epilepsy - will likely not need this long-term  -would ideally obtain routine EEG if able - if unable this can be done as an outpatient  -no clear indication for MRI as inpatient but may be done as an outpatient as well  -monitor for alcohol withdrawal  -Ativan PRN for recurrent seizure > 3-5 mins or recurrent events without return to baseline  -if repeat seizure would recommend inpatient EEG and possible transfer if indicated  -education of alcohol cessation and resources if amenable  -pt should be informed he may not drive and [employer] form should be filled out  -if he remains stable into tomorrow AM, can be discharged from Neurology standpoint with outpatient follow-up  -call with questions     Alan Saldaña will need follow up in in 4 weeks with epilepsy attending  He will require a routine EEG within 2-4 weeks        Test Results after admission  Pertinent Lab tests (dates/results):  Culture results (blood, urine, spinal, wound, respiratory, etc ):  Imaging tests (dates/results):  EKG (dates/results):   Other test (dates/results):  Tests pending (dates/results):    Surgical or Invasive Procedures  Name of surgery/procedure:  Date & Time:  Patient Response:  Post-operative orders:  Operative Report/Findings:    Response to Treatment, Major Change in Condition, Major Charge in Treatment anytime during admission    Disposition on Discharge  Home, Rehab, SNF, LTC, Shelter, etc : Home/Self Care    Cease to Breathe (CTB)  If a patient expires during an admission, in addition to the above information, please include:    Summary/timeline of the patient's decline in condition:    Medications and treatment:    Patient response to treatment:    Date and time patient ceased to breathe:        Is there a Readmission that follows this admission? Yes X No    If yes, reason for denial:          InterQual Review    InterQual Criteria Met: X Yes  No  N/A        Please include the InterQual Review, InterQual year/version used, and the criteria selected:     Patient: Vania Hernandez  Review Number: [id number]  Review Status: In Primary  Criteria Status: Acute Met  Day Met: Episode Day 1     Condition Specific: Yes        OUTCOMES  Outcome Type: Primary           REVIEW DETAILS     Product: [employer] Adult  Subset: Electrolyte or Mineral Imbalance      (Symptom or finding within 24h)         (Excludes PO medications unless noted)          [X] Select Day, One:              [X] Episode Day 1, One:                  [X] ACUTE, >= One:                      [X] Hypomagnesemia, Both:                          [X] Finding, One:                              [X] Magnesium 1 0-1 4 mg/dL(0 41-0 58 mmol/L) and symptomatic                          [X] Magnesium repletion     Version: InterQual® 2020  InterQual® and InterQual®  © 2020 [employer] and/or one of its subsidiaries  All Rights Reserved  CPT only © 2019 [employer]  All Rights Reserved  PLEASE SUBMIT THE COMPLETED FORM TO THE DEPARTMENT OF HUMAN SERVICES - DIVISION OF CLINICAL  REVIEW VIA FAX -[phone] or VIA E-MAIL TO [email or web address]    Signature: Charmaine Lebron Date:  10/20/21    Confidentiality Notice: The documents accompanying this telecopy may contain confidential information belonging to the sender  The information is intended only for the use of the individual named above  If you are not the intended recipient, you are hereby notified  That any disclosure, copying, distribution or taking of any telecopy is strictly prohibited

## 2021-12-17 ENCOUNTER — TELEPHONE (OUTPATIENT)
Dept: NEUROLOGY | Facility: CLINIC | Age: 56
End: 2021-12-17

## 2022-01-03 ENCOUNTER — TELEPHONE (OUTPATIENT)
Dept: NEUROLOGY | Facility: CLINIC | Age: 57
End: 2022-01-03

## 2022-01-03 NOTE — TELEPHONE ENCOUNTER
Called and spoke with the patient to see if he is willing to switch to a virtual visit  Patient said he does not have a computer or video on his phone  He stated his nephew is coming and may be willing to do the video for him on his phone and will call us back to let us know and provide a phone number  Patient also stated he is having issues getting into the office also

## 2022-03-23 ENCOUNTER — TELEPHONE (OUTPATIENT)
Dept: NEUROLOGY | Facility: CLINIC | Age: 57
End: 2022-03-23

## 2022-04-19 ENCOUNTER — TELEPHONE (OUTPATIENT)
Dept: NEUROLOGY | Facility: CLINIC | Age: 57
End: 2022-04-19

## 2022-04-19 NOTE — TELEPHONE ENCOUNTER
Pt called as he had an episode of seizure 3 days ago that lasted for about 15 minutes  Pt now wants to see a neurology doctor  He was scheduled twice for HFU 01/03/22 was canceled and 03/31/22 was NO SHOW  Based on ED note he had TeleMed 08/14/21 "Aurora Escoto will need follow up in in 4 weeks with epilepsy attending  He will require a routine EEG within 2-4 weeks " which he never get his routine EEG done  I will transfer the pt to care team as he is seeking for medical advise of what to do

## 2022-04-19 NOTE — TELEPHONE ENCOUNTER
Spoke with the pt to schedule him appointment  Scheduled: 08/19/22 @ 8am with Dr Kayla Jacinto with [facility]  Pt is in [facility]  Please see notes below

## 2022-04-19 NOTE — TELEPHONE ENCOUNTER
Patient had seizure 3 days ago  Seizure described as convulsing seizure  Not evaluated in ED  Was prescribed keppra while in hospital  Had stopped taking medication when seizure occurred  Restarted medication post event  No drug or other known triggers other than stopping medication, + alcohol use  Patient has NOT established care with office  Initial hospital visit was 8/2021  Patient had cancelled [place] for January and no showed to visit in March  Recommended he follow with PCP for refills and guidance until care is established with office  Transferred to scheduling for NP appt  Medications originally prescribed:  Levetiracetam 750mg 1 tablet BID

## 2022-04-19 NOTE — TELEPHONE ENCOUNTER
This should be forwarded to the MA's who run the epilepsy providers  Ronen Torres is one of them and I think Jessi Givens is running [facility] but I am not absolutely sure if she started yet   Thank you

## 2022-04-20 ENCOUNTER — TELEPHONE (OUTPATIENT)
Dept: NEUROLOGY | Facility: CLINIC | Age: 57
End: 2022-04-20

## 2022-04-20 NOTE — TELEPHONE ENCOUNTER
I will keep my eye on Dr Erik Hoffmann and Dr Dom Guillen schedules to find an appointment for   Marry Kallie

## 2022-04-20 NOTE — TELEPHONE ENCOUNTER
I called patient to offer earlier appointment with Dr Judith Stephenson on 4/25/22 at 8:30a in [facility]  Patient accepted appointment and is aware of time/date/location

## 2022-04-21 ENCOUNTER — TELEPHONE (OUTPATIENT)
Dept: NEUROLOGY | Facility: CLINIC | Age: 57
End: 2022-04-21

## 2022-04-21 NOTE — TELEPHONE ENCOUNTER
I was working the [employer] and this patient is scheduled with Dr Yessy Adam on 4/25/22  We have him listed as self-pay, but upon further review I discovered he has [address]  I believe this was originally a HFU but it has been cancelled and rescheduled a few time  Do we still see this patient?

## 2022-04-21 NOTE — TELEPHONE ENCOUNTER
Informed patient we do not participate with his insurance, he's asking of the cost for self pay or another solution because he really needs to see a neurologist  Please advise

## 2022-04-22 NOTE — TELEPHONE ENCOUNTER
Attempted to reach patient regarding this information, no voicemail set up  Unfortunately, we do have to cancel appointment until patient can par with an insurance plan that covers him financially as we are unclear what out of pocket price would be for a Consult visit with us including orders the provider will recommend  Will mail out cancellation letter regarding information provided